# Patient Record
Sex: MALE | Race: WHITE | NOT HISPANIC OR LATINO | URBAN - METROPOLITAN AREA
[De-identification: names, ages, dates, MRNs, and addresses within clinical notes are randomized per-mention and may not be internally consistent; named-entity substitution may affect disease eponyms.]

---

## 2017-01-01 ENCOUNTER — HOSPITAL ENCOUNTER (EMERGENCY)
Facility: HOSPITAL | Age: 27
Discharge: HOME | End: 2017-12-11
Attending: EMERGENCY MEDICINE | Admitting: EMERGENCY MEDICINE

## 2017-01-01 ENCOUNTER — APPOINTMENT (EMERGENCY)
Dept: RADIOLOGY | Facility: HOSPITAL | Age: 27
End: 2017-01-01

## 2017-01-01 ENCOUNTER — HOSPITAL ENCOUNTER (EMERGENCY)
Facility: HOSPITAL | Age: 27
Discharge: HOME | End: 2017-11-16
Attending: EMERGENCY MEDICINE

## 2017-01-01 VITALS
RESPIRATION RATE: 16 BRPM | HEART RATE: 74 BPM | TEMPERATURE: 98.3 F | DIASTOLIC BLOOD PRESSURE: 74 MMHG | OXYGEN SATURATION: 97 % | SYSTOLIC BLOOD PRESSURE: 127 MMHG | WEIGHT: 202.6 LBS

## 2017-01-01 VITALS
HEART RATE: 110 BPM | DIASTOLIC BLOOD PRESSURE: 76 MMHG | RESPIRATION RATE: 18 BRPM | OXYGEN SATURATION: 100 % | SYSTOLIC BLOOD PRESSURE: 145 MMHG | TEMPERATURE: 99.1 F

## 2017-01-01 DIAGNOSIS — J02.9 PHARYNGITIS: Primary | ICD-10-CM

## 2017-01-01 DIAGNOSIS — B00.1 RECURRENT HERPES LABIALIS: ICD-10-CM

## 2017-01-01 DIAGNOSIS — F41.1 ANXIETY REACTION: ICD-10-CM

## 2017-01-01 DIAGNOSIS — F11.10 HEROIN ABUSE (HCC): Primary | ICD-10-CM

## 2017-01-01 LAB
ATRIAL RATE: 62 BPM
P AXIS: 75 DEGREES
PR INTERVAL: 136 MS
QRS AXIS: 78 DEGREES
QRSD INTERVAL: 96 MS
QT INTERVAL: 378 MS
QTC INTERVAL: 383 MS
T WAVE AXIS: 56 DEGREES
VENTRICULAR RATE: 62 BPM

## 2017-01-01 PROCEDURE — 71010 HB CHEST X-RAY 1 VIEW FRONTAL (PORTABLE): CPT

## 2017-01-01 PROCEDURE — 93005 ELECTROCARDIOGRAM TRACING: CPT | Performed by: EMERGENCY MEDICINE

## 2017-01-01 PROCEDURE — 99283 EMERGENCY DEPT VISIT LOW MDM: CPT

## 2017-01-01 PROCEDURE — 99284 EMERGENCY DEPT VISIT MOD MDM: CPT

## 2017-01-01 RX ORDER — IBUPROFEN 400 MG/1
400 TABLET ORAL ONCE
Status: COMPLETED | OUTPATIENT
Start: 2017-01-01 | End: 2017-01-01

## 2017-01-01 RX ORDER — VALACYCLOVIR HYDROCHLORIDE 500 MG/1
2000 TABLET, FILM COATED ORAL ONCE
Status: COMPLETED | OUTPATIENT
Start: 2017-01-01 | End: 2017-01-01

## 2017-01-01 RX ORDER — VALACYCLOVIR HYDROCHLORIDE 1 G/1
2000 TABLET, FILM COATED ORAL ONCE
Qty: 2 TABLET | Refills: 0 | Status: SHIPPED | OUTPATIENT
Start: 2017-01-01 | End: 2018-01-01

## 2017-01-01 RX ORDER — AMOXICILLIN AND CLAVULANATE POTASSIUM 875; 125 MG/1; MG/1
1 TABLET, FILM COATED ORAL EVERY 12 HOURS
Qty: 14 TABLET | Refills: 0 | Status: SHIPPED | OUTPATIENT
Start: 2017-01-01 | End: 2017-01-01

## 2017-01-01 RX ORDER — AMOXICILLIN AND CLAVULANATE POTASSIUM 875; 125 MG/1; MG/1
1 TABLET, FILM COATED ORAL ONCE
Status: COMPLETED | OUTPATIENT
Start: 2017-01-01 | End: 2017-01-01

## 2017-01-01 RX ADMIN — IBUPROFEN 400 MG: 400 TABLET, FILM COATED ORAL at 19:29

## 2017-01-01 RX ADMIN — AMOXICILLIN AND CLAVULANATE POTASSIUM 1 TABLET: 875; 125 TABLET, FILM COATED ORAL at 19:29

## 2017-01-01 RX ADMIN — LIDOCAINE HYDROCHLORIDE 15 ML: 20 SOLUTION ORAL; TOPICAL at 19:29

## 2017-01-01 RX ADMIN — VALACYCLOVIR HYDROCHLORIDE 2000 MG: 500 TABLET, FILM COATED ORAL at 19:32

## 2017-11-17 NOTE — ED PROVIDER NOTES
History  Chief Complaint   Patient presents with    Sore Throat     Pt presents with sore throat that got worse today, pt states he had a fever a week ago but returned today, pt states he has a cough and heaviness in his chest    Mouth Lesions     Pt also with cold sores on lips and in mouth       History provided by:  Patient   used: No     68-year-old male presented with worsening sore throat, slight cough as well as herpes labialis lesions developing over the last few days  States he has been sick overall about a week with fever, cough and his symptoms had improved but then over the last 24 hours significantly worsened  Has not taken any thing for pain  Some difficulty eating due to oral discomfort  On exam here he has multiple vesicular lesions on the lips and tongue  Posterior pharynx is erythematous with tonsillar edema  Tender cervical lymphadenopathy  Given oral course of illness suspect secondary bacterial infection after a viral illness in addition to herpes outbreak  Will treat with antibiotic, antiviral, lidocaine for symptomatic relief  Will have return if symptoms worsen  None       History reviewed  No pertinent past medical history  History reviewed  No pertinent surgical history  History reviewed  No pertinent family history  I have reviewed and agree with the history as documented  Social History   Substance Use Topics    Smoking status: Current Every Day Smoker    Smokeless tobacco: Never Used    Alcohol use No        Review of Systems   Constitutional: Positive for appetite change and fever  Negative for activity change and fatigue  HENT: Positive for sore throat  Negative for trouble swallowing and voice change  Respiratory: Positive for cough  Negative for chest tightness and shortness of breath  Musculoskeletal: Negative for neck pain and neck stiffness  Neurological: Negative for dizziness, weakness, light-headedness and numbness  All other systems reviewed and are negative  Physical Exam  ED Triage Vitals [11/16/17 1753]   Temperature Pulse Respirations Blood Pressure SpO2   99 1 °F (37 3 °C) (!) 110 18 145/76 100 %      Temp Source Heart Rate Source Patient Position - Orthostatic VS BP Location FiO2 (%)   Oral Monitor Sitting Left arm --      Pain Score       6           Orthostatic Vital Signs  Vitals:    11/16/17 1753   BP: 145/76   Pulse: (!) 110   Patient Position - Orthostatic VS: Sitting       Physical Exam   Constitutional: He is oriented to person, place, and time  He appears well-developed and well-nourished  No distress  HENT:   Head: Normocephalic and atraumatic  Multiple vesicular lesions on the lips, tongue  Posterior pharynx erythematous  Eyes: EOM are normal  Pupils are equal, round, and reactive to light  Neck: Normal range of motion  Neck supple  Cardiovascular: Normal rate and regular rhythm  Pulmonary/Chest: Effort normal and breath sounds normal    Neurological: He is alert and oriented to person, place, and time  Skin: Skin is warm and dry  No rash noted  Psychiatric: He has a normal mood and affect  His behavior is normal    Nursing note and vitals reviewed        ED Medications  Medications   valACYclovir (VALTREX) tablet 2,000 mg (2,000 mg Oral Given 11/16/17 1932)   amoxicillin-clavulanate (AUGMENTIN) 875-125 mg per tablet 1 tablet (1 tablet Oral Given 11/16/17 1929)   lidocaine viscous (XYLOCAINE) 2 % mucosal solution 15 mL (15 mL Swish & Swallow Given 11/16/17 1929)   ibuprofen (MOTRIN) tablet 400 mg (400 mg Oral Given 11/16/17 1929)       Diagnostic Studies  Results Reviewed     None                 No orders to display              Procedures  Procedures       Phone Contacts  ED Phone Contact    ED Course  ED Course                                MDM  Number of Diagnoses or Management Options  Pharyngitis:   Recurrent herpes labialis:   Diagnosis management comments: 24-year-old male with sore throat worsening over the last day  Sick previously, improved to now worse  Afebrile here  Also has oral herpes outbreak  Treating with Augmentin for suspected secondary bacterial infection as well as valacyclovir and lidocaine for symptomatic relief  To follow up if symptoms worsen  Patient Progress  Patient progress: improved    CritCare Time    Disposition  Final diagnoses:   Pharyngitis   Recurrent herpes labialis     Time reflects when diagnosis was documented in both MDM as applicable and the Disposition within this note     Time User Action Codes Description Comment    11/16/2017  8:13 PM Vladislav Agrawal Add [J02 9] Pharyngitis     11/16/2017  8:13 PM Wallace Singh Add [B00 1] Recurrent herpes labialis       ED Disposition     ED Disposition Condition Comment    Discharge  Henry Perez  discharge to home/self care  Condition at discharge: Stable        Follow-up Information     Follow up With Specialties Details Why Contact Info Additional [address] Emergency Department Emergency Medicine  If symptoms worsen [address]  AN ED, [address], [facility]        Discharge Medication List as of 11/16/2017  8:15 PM      START taking these medications    Details   amoxicillin-clavulanate (AUGMENTIN) 875-125 mg per tablet Take 1 tablet by mouth every 12 (twelve) hours for 7 days, Starting Thu 11/16/2017, Until Thu 11/23/2017, Print      lidocaine viscous (XYLOCAINE) 2 % mucosal solution Swish and swallow 10 mL 4 (four) times a day as needed for mild pain, Starting Thu 11/16/2017, Print      valACYclovir (VALTREX) 1,000 mg tablet Take 2 tablets by mouth once for 1 dose, Starting Thu 11/16/2017, Print           No discharge procedures on file      ED Provider  Electronically Signed by           Yazmin Figueroa MD  11/17/17 0189

## 2017-11-17 NOTE — DISCHARGE INSTRUCTIONS
Pharyngitis   AMBULATORY CARE:   Pharyngitis , or sore throat, is inflammation of the tissues and structures in your pharynx (throat)  Pharyngitis is most often caused by bacteria  It may also be caused by a cold or flu virus  Other causes include smoking, allergies, or acid reflux  Signs and symptoms that may occur with pharyngitis:   · Sore throat or pain when you swallow    · Fever, chills, and body aches    · Hoarse or raspy voice    · Cough, runny or stuffy nose, itchy or watery eyes    · Headache    · Upset stomach and loss of appetite    · Mild neck stiffness    · Swollen glands that feel like hard lumps when you touch your neck    · White and yellow pus-filled blisters in the back of your throat  Call 911 for any of the following:   · You have trouble breathing or swallowing because your throat is swollen or sore  Seek care immediately if:   · You are drooling because it hurts too much to swallow  · Your fever is higher than 102? F (39?C) or lasts longer than 3 days  · You are confused  · You taste blood in your throat  Contact your healthcare provider if:   · Your throat pain gets worse  · You have a painful lump in your throat that does not go away after 5 days  · Your symptoms do not improve after 5 days  · You have questions or concerns about your condition or care  Treatment for pharyngitis:  Viral pharyngitis will go away on its own without treatment  Your sore throat should start to feel better in 3 to 5 days for both viral and bacterial infections  You may need any of the following:  · Antibiotics  treat a bacterial infection  · NSAIDs , such as ibuprofen, help decrease swelling, pain, and fever  NSAIDs can cause stomach bleeding or kidney problems in certain people  If you take blood thinner medicine, always ask your healthcare provider if NSAIDs are safe for you  Always read the medicine label and follow directions  · Acetaminophen  decreases pain and fever   It is available without a doctor's order  Ask how much to take and how often to take it  Follow directions  Acetaminophen can cause liver damage if not taken correctly  Manage your symptoms:   · Gargle salt water  Mix ¼ teaspoon salt in an 8 ounce glass of warm water and gargle  This may help decrease swelling in your throat  · Drink liquids as directed  You may need to drink more liquids than usual  Liquids may help soothe your throat and prevent dehydration  Ask how much liquid to drink each day and which liquids are best for you  · Use a cool-steam humidifier  to help moisten the air in your room and calm your cough  · Soothe your throat  with cough drops, ice, soft foods, or popsicles  Prevent the spread of pharyngitis:  Cover your mouth and nose when you cough or sneeze  Do not share food or drinks  Wash your hands often  Use soap and water  If soap and water are unavailable, use an alcohol based hand   Follow up with your healthcare provider as directed:  Write down your questions so you remember to ask them during your visits  © 2017 [facility] [facility] Information is for End User's use only and may not be sold, redistributed or otherwise used for commercial purposes  All illustrations and images included in CareNotes® are the copyrighted property of A D A M [employer] Inc  or [employer]aver  The above information is an  only  It is not intended as medical advice for individual conditions or treatments  Talk to your doctor, nurse or pharmacist before following any medical regimen to see if it is safe and effective for you  Oral Herpes Simplex Virus Infections   WHAT YOU NEED TO KNOW:   What are oral herpes simplex virus infections? Oral herpes simplex virus (HSV) infections cause sores to form on the mouth, lips, or gums  HSV has 2 types  Oral HSV infections are most often caused by HSV type 1   HSV type 2 normally affects the genital area, but may also occur in the mouth  After you are infected, the virus hides in your nerves and may return  An HSV infection that comes back is also known as a cold sore  What causes oral herpes simplex virus infections? Oral HSV infections are easily spread through close, personal contact with someone who has HSV  The infection may be spread when you touch the blisters and then touch your eyes, mouth, nose, or a cut  The infection can also spread through a kiss or on a shared eating utensil  Oral HSV infections that come back may be triggered by stress, trauma, or exposure to temperature extremes  They may also be triggered by anything that weakens your immune system, such as a cold or the flu  What are the signs and symptoms of oral herpes simplex virus infections? Signs and symptoms of oral HSV infections usually develop suddenly and heal without treatment in about 10 days  Sores are usually filled with a yellow-white fluid and may break easily  Sores may join together to form larger open sores  You may also have the following:  · Burning, tingling, itching, or pain at the affected area before sores form    · Blisters or painful small, round, shallow ulcers on the lips, mouth, or gums    · Fever, chills, headache, and enlarged lymph nodes in the neck    · Getting tired and irritated easily and more than normal for you    · Loss of appetite or not wanting to eat or drink    · Red, swollen, bleeding gums, or sore throat  How are oral herpes simplex virus infections diagnosed? Your healthcare provider will examine you and carefully look at your blisters  He may ask if you have other medical conditions  He will also need to know when the sores started, along with your other symptoms  You may also need any of the following:  · Fluid sample:  A sample is taken from the affected area and checked under the microscope or sent for other testing       · Blood tests:  Your blood is tested for antibodies and to see if you have been exposed to HSV  The tests may also show if you have developed antibodies from exposure to HSV  · Biopsy: This is a procedure used to remove a small piece of tissue from the ulcer  This sample is then sent to the lab for tests  How are oral herpes simplex virus infections treated? HSV infection sores normally heal on their own without scarring  You may need any of the following to help manage symptoms:  · Antiviral medicine: This decreases symptoms and shortens the amount of time blisters are present  You may also need to take it daily to prevent blisters  The medicine may be given as a liquid, , pill, or ointment  Use as directed  · Numbing medicine: This decreases mouth pain  It is usually given as a mouth rinse  Use it before you eat or drink, or as directed  How can I manage my symptoms? · Eat soft, bland foods:  Avoid salty, acidic, spicy, sharp-edged, and hard foods  Eat healthy foods to help healing  · Drink liquids:  Cool liquids may help soothe your mouth and numb the pain  Avoid citrus or carbonated drinks, such as orange or grapefruit juice, lemonade, or soda  These liquids may cause your mouth to hurt more  A straw may help if you have blisters on the lips or tongue  · Use ice:  Ice helps decrease swelling and pain  Drink cold water or suck on ice to help decrease pain on your tongue or inside your mouth  Use an ice pack, or put crushed ice in a plastic bag on your lip  Cover it with a towel and place it on your lip for 15 to 20 minutes every hour or as directed  What are the risks of oral herpes simplex virus infection? If left untreated, an oral HSV infection may make it difficult to eat or drink because of the pain  The infection may spread to other parts of your body or to other people  The infection can return and affect other areas, such as your brain  How can I prevent the spread of the herpes simplex virus?    · Do not have close contact with people until the blisters heal  This includes touching, kissing, and oral sex  · Do not get close to babies or to people who are sick while you have cold sores  · Do not share eating utensils, towels, lip balm, or makeup with another person  · Do not touch the blisters or pick at the scabs  Do not touch other body parts, especially your eyes or genitals without washing your hands first  Wash your hands often  When should I contact my healthcare provider? · You have a fever  · Your symptoms become worse or do not improve a week after you start treatment  · You have difficulty eating or drinking because of the pain in your mouth  · You get a headache, are nauseated, or vomit  · Your eyes feel irritated or you feel like you have something in your eye  · Your skin becomes itchy, swollen, or develops a rash after you take your medicine  · You have questions or concerns about your condition or care  When should I seek immediate care? · You get a fever, feel achy, or see pus instead of clear fluid in the sores  · You get sores on your eyes  · You have abdominal pain, a severe headache, or confusion  · You get new symptoms, or old symptoms return after you have been treated  CARE AGREEMENT:   You have the right to help plan your care  Learn about your health condition and how it may be treated  Discuss treatment options with your caregivers to decide what care you want to receive  You always have the right to refuse treatment  The above information is an  only  It is not intended as medical advice for individual conditions or treatments  Talk to your doctor, nurse or pharmacist before following any medical regimen to see if it is safe and effective for you  © 2017 [address] [employer] Information is for End User's use only and may not be sold, redistributed or otherwise used for commercial purposes   All illustrations and images included in CareNotes® are the copyrighted property of [employer]  or Eben Beaver

## 2017-12-11 NOTE — ED PROCEDURE NOTE
PROCEDURE  ECG 12 Lead Documentation  Date/Time: 12/11/2017 7:51 AM  Performed by: Sierra Samuels by: Susy Barnett     ECG reviewed by me, the ED Provider: yes    Patient location:  ED  Previous ECG:     Previous ECG:  Compared to current    Similarity:  No change  Interpretation:     Interpretation: normal    Rate:     ECG rate assessment: normal    Rhythm:     Rhythm: sinus rhythm    Ectopy:     Ectopy: none    QRS:     QRS axis:  Normal    QRS intervals:  Normal  Conduction:     Conduction: normal    ST segments:     ST segments:  Normal  T waves:     T waves: normal

## 2017-12-11 NOTE — ED PROVIDER NOTES
History  Chief Complaint   Patient presents with    Overdose - Accidental     Pt  was getting high "and accidently took too much oxycodone and heroin " Pt  used 7-8 bags of heroin over last day, 60 mg of oxycodone over the last day  Pt  reports having difficulty breathing       History provided by:  Patient  Overdose - Accidental   Ingested substance:  Illicit drugs  Suspected agents: heroin   Witnesses present: no    Called poison control: no    Context: recreational (Was sober for 7 years and relapsed into heroin abuse last night)    Associated symptoms: anxiety and shortness of breath (Complaints of shortness of breath at this point time  States that he is having a hard time getting oxygen in  Does not appear intoxicated but appears very stressed and full of anxiety)    Associated symptoms: no abdominal pain, no chest pain, no cough, no diarrhea, no headaches and no nausea        Prior to Admission Medications   Prescriptions Last Dose Informant Patient Reported? Taking?   lidocaine viscous (XYLOCAINE) 2 % mucosal solution   No No   Sig: Swish and swallow 10 mL 4 (four) times a day as needed for mild pain   valACYclovir (VALTREX) 1,000 mg tablet   No No   Sig: Take 2 tablets by mouth once for 1 dose      Facility-Administered Medications: None       History reviewed  No pertinent past medical history  History reviewed  No pertinent surgical history  History reviewed  No pertinent family history  I have reviewed and agree with the history as documented  Social History   Substance Use Topics    Smoking status: Current Every Day Smoker    Smokeless tobacco: Never Used    Alcohol use No        Review of Systems   Constitutional: Negative for chills and fever  HENT: Negative for rhinorrhea, sore throat and trouble swallowing  Eyes: Negative for pain  Respiratory: Positive for shortness of breath (Complaints of shortness of breath at this point time    States that he is having a hard time getting oxygen in  Does not appear intoxicated but appears very stressed and full of anxiety)  Negative for cough, wheezing and stridor  Cardiovascular: Negative for chest pain and leg swelling  Gastrointestinal: Negative for abdominal pain, diarrhea and nausea  Endocrine: Negative for polyuria  Genitourinary: Negative for dysuria, flank pain and urgency  Musculoskeletal: Negative for joint swelling, myalgias and neck stiffness  Skin: Negative for rash  Allergic/Immunologic: Negative for immunocompromised state  Neurological: Negative for dizziness, syncope, weakness, numbness and headaches  Psychiatric/Behavioral: Negative for confusion and suicidal ideas  The patient is nervous/anxious  All other systems reviewed and are negative  Physical Exam  ED Triage Vitals [12/11/17 0737]   Temperature Pulse Respirations Blood Pressure SpO2   98 3 °F (36 8 °C) 81 18 143/75 99 %      Temp Source Heart Rate Source Patient Position - Orthostatic VS BP Location FiO2 (%)   Oral Monitor Lying Right arm --      Pain Score       No Pain           Orthostatic Vital Signs  Vitals:    12/11/17 0737 12/11/17 0829   BP: 143/75 127/74   Pulse: 81 74   Patient Position - Orthostatic VS: Lying Sitting       Physical Exam   Constitutional: He is oriented to person, place, and time  He appears well-developed and well-nourished  HENT:   Head: Normocephalic and atraumatic  Eyes: EOM are normal  Pupils are equal, round, and reactive to light  Neck: Normal range of motion  Neck supple  Cardiovascular: Normal rate and regular rhythm  Exam reveals no friction rub  No murmur heard  Pulmonary/Chest: Breath sounds normal  No respiratory distress  He has no wheezes  He has no rales  Abdominal: Soft  Bowel sounds are normal  He exhibits no distension  There is no tenderness  Musculoskeletal: Normal range of motion  He exhibits no edema or tenderness     Neurological: He is alert and oriented to person, place, and time  Skin: Skin is warm  No rash noted  Psychiatric: He has a normal mood and affect  Nursing note and vitals reviewed  ED Medications  Medications - No data to display    Diagnostic Studies  Results Reviewed     None                 XR chest 1 view portable    (Results Pending)              Procedures  Procedures       Phone Contacts  ED Phone Contact    ED Course  ED Course                                MDM  Number of Diagnoses or Management Options  Anxiety reaction: new and requires workup  Heroin abuse: new and requires workup  Diagnosis management comments: 24-year-old with history of heroin abuse sober for 7 years started using again last night noted with shortness of breath and anxiety earlier this morning  Last dose of heroin was approximately 1-2 hours ago  Does not appear intoxicated at the on time a on initial evaluation  No respiratory distress no decreased respirations  Possibly could be anxiety or stress reaction  Patient states been going through significant amount of life stresses that have been causing the patient to use heroin  He at this point the on the EKG and x-rays unremarkable  Plan outpatient management and follow-up  Adriana cantu         Amount and/or Complexity of Data Reviewed  Tests in the radiology section of CPT®: ordered and reviewed      CritCare Time    Disposition  Final diagnoses:   Heroin abuse   Anxiety reaction     Time reflects when diagnosis was documented in both MDM as applicable and the Disposition within this note     Time User Action Codes Description Comment    12/11/2017  8:25 AM Celester Levy Add [F11 10] Heroin abuse     12/11/2017  8:25 AM Celester Levy Add [F41 1] Anxiety reaction       ED Disposition     ED Disposition Condition Comment    Discharge  Alaina Kohler  discharge to home/self care      Condition at discharge: Stable        Follow-up Information     Follow up With Specialties Details Why [address] [facility]  Call in 2 days If symptoms worsen [facility]        Discharge Medication List as of 12/11/2017  8:26 AM      CONTINUE these medications which have NOT CHANGED    Details   lidocaine viscous (XYLOCAINE) 2 % mucosal solution Swish and swallow 10 mL 4 (four) times a day as needed for mild pain, Starting Thu 11/16/2017, Print      valACYclovir (VALTREX) 1,000 mg tablet Take 2 tablets by mouth once for 1 dose, Starting Thu 11/16/2017, Print           No discharge procedures on file      ED Provider  Electronically Signed by           Frank Nelsno DO  12/11/17 2904

## 2017-12-11 NOTE — DISCHARGE INSTRUCTIONS
Anxiety   WHAT YOU SHOULD KNOW:   Anxiety is a condition that causes you to feel excessive worry, uneasiness, or fear  Family or work stress, smoking, caffeine, and alcohol can increase your risk for anxiety  Certain medicines or health conditions can also increase your risk  Anxiety may begin gradually, and can become a long-term condition if it is not managed or treated  AFTER YOU LEAVE:   Medicines:   · Medicines  can help you feel more calm and relaxed, and decrease your symptoms  · Take your medicine as directed  Contact your healthcare provider if you think your medicine is not helping or if you have side effects  Tell him if you are allergic to any medicine  Keep a list of the medicines, vitamins, and herbs you take  Include the amounts, and when and why you take them  Bring the list or the pill bottles to follow-up visits  Carry your medicine list with you in case of an emergency  Follow up with your healthcare provider within 2 weeks or as directed:  Write down your questions so you remember to ask them during your visits  Manage anxiety:   · Go to counseling as directed  Cognitive behavioral therapy can help you understand and change how you react to events that trigger your symptoms  · Find ways to manage your symptoms  Activities such as exercise, meditation, or listening to music can help you relax  · Practice deep breathing  Breathing can change how your body reacts to stress  Focus on taking slow, deep breaths several times a day, or during an anxiety attack  Breathe in through your nose, and out through your mouth  · Avoid caffeine  Caffeine can make your symptoms worse  Avoid foods or drinks that are meant to increase your energy level  · Limit or avoid alcohol  Ask your healthcare provider if alcohol is safe for you  You may not be able to drink alcohol if you take certain anxiety or depression medicines  Limit alcohol to 1 drink per day if you are a woman   Limit alcohol to 2 drinks per day if you are a man  A drink of alcohol is 12 ounces of beer, 5 ounces of wine, or 1½ ounces of liquor  Contact your healthcare provider if:   · Your symptoms get worse or do not get better with treatment  · You think your medicine may be causing side effects  · Your anxiety keeps you from doing your regular daily activities  · You have new symptoms since your last visit  · You have questions or concerns about your condition or care  Seek care immediately or call 911 if:   · You have chest pain, tightness, or heaviness that may spread to your shoulders, arms, jaw, neck, or back  · You feel like hurting yourself or someone else  · You feel dizzy, lightheaded, or faint  © 2014 [address] is for End User's use only and may not be sold, redistributed or otherwise used for commercial purposes  All illustrations and images included in CareNotes® are the copyrighted property of A D A M , Inc  or Eben Beaver  The above information is an  only  It is not intended as medical advice for individual conditions or treatments  Talk to your doctor, nurse or pharmacist before following any medical regimen to see if it is safe and effective for you  Narcotic Abuse   AMBULATORY CARE:   Narcotic abuse  is when you continue to use narcotics even though they are hurting you or others  Common symptoms include the following:   · Trouble doing your job, attending school, or doing necessary things at home, such as care for your children    · Driving a vehicle or operating machinery while under the influence of narcotics    · Legal problems, such as being arrested while under the influence of narcotics    · Ongoing problems with your friends, family, or others that are caused or made worse by using narcotics  Seek care immediately if:   · You are very drowsy  · Your speech is slurred      · You have trouble thinking, remembering things, or focusing  Contact your healthcare provider if:   · You want help or information on how to stop using or abusing narcotics  Narcotic dependence  is when using the drugs leads to at least 3 of the following problems within 1 year:  · Tolerance to narcotics  that makes you need more narcotics to feel its effects  · Withdrawal symptoms  if you stop using narcotics after using them heavily over a period of time  Withdrawal may also happen if your healthcare provider changes your medicine  You may try using a similar drug to reduce or prevent the signs and symptoms of withdrawal      · Use of more narcotics  than you should  You may use more of the narcotic or use it over a longer period of time than you intended  · No ability to decrease or control  your use of narcotics  You may want narcotics all of the time  You may feel it is not possible to decrease or control the amount you are using  · Less time spent  around others, at work, or doing activities that you enjoy  You may spend most or all of your time using narcotics, searching for narcotics, or managing a hangover  A hangover is a feeling you have hours after using a drug  You may feel very tired and nauseated  · Continued use of the drug  even though it worsens your physical or mental condition  For example, you may get depressed after you use narcotics, but you keep using them  Narcotic intoxication  usually lasts for several hours  You may have the following during or after you use narcotics:  · Behavior or mood changes, such as a great feeling followed by the feeling that you do not care about anyone or anything    · Trouble thinking, remembering things, or focusing    · Small pupils    · Feeling very drowsy    · Slurred speech  Narcotic withdrawal  occurs if you stop using narcotics after using them heavily over a period of time   Signs and symptoms may begin within minutes or days and continue for days or even months:  · Depression and anxiety    · Nausea or vomiting    · Muscle aches    · Watery eyes or runny nose    · Large pupils    · Sweating or goosebumps on your skin    · Diarrhea    · Fever    · Trouble sleeping  How narcotics can harm a pregnant woman and her baby:   · Tell your healthcare provider right away if you are trying to get pregnant or you are pregnant and you are using narcotics  Your doctor may suggest other medicines to control pain and prevent withdrawal  If you go through withdrawal while pregnant, you may miscarry your baby, or he may be stillborn  He may be very small and have other medical problems  · When your baby is born, he may show signs of withdrawal  This includes unexpected weight loss, poor feeding, and more crying than normal  Your baby may also have a fever, vomit, and have diarrhea  He may also have learning problems or other health issues when he gets older  If you have a baby and you are using narcotics, you may have trouble caring for your baby  Narcotics may be passed to your baby through breast milk  Talk to your healthcare provider before breastfeeding your baby if you are using narcotics  Follow up with your healthcare provider as directed:  Write down your questions so you remember to ask them during your visits  © 2017 [address] [employer]  Information is for End User's use only and may not be sold, redistributed or otherwise used for commercial purposes  All illustrations and images included in CareNotes® are the copyrighted property of [employer]  or Eben Beaver  The above information is an  only  It is not intended as medical advice for individual conditions or treatments  Talk to your doctor, nurse or pharmacist before following any medical regimen to see if it is safe and effective for you

## 2018-01-01 ENCOUNTER — HOSPITAL ENCOUNTER (EMERGENCY)
Facility: HOSPITAL | Age: 28
Discharge: LEFT BEFORE DISCHARGE | End: 2018-06-23
Attending: EMERGENCY MEDICINE

## 2018-01-01 ENCOUNTER — HOSPITAL ENCOUNTER (EMERGENCY)
Facility: HOSPITAL | Age: 28
Discharge: HOME | End: 2018-05-19
Attending: EMERGENCY MEDICINE | Admitting: EMERGENCY MEDICINE
Payer: COMMERCIAL

## 2018-01-01 ENCOUNTER — APPOINTMENT (EMERGENCY)
Dept: RADIOLOGY | Facility: HOSPITAL | Age: 28
End: 2018-01-01
Payer: COMMERCIAL

## 2018-01-01 ENCOUNTER — TELEPHONE (OUTPATIENT)
Dept: NEUROLOGY | Facility: CLINIC | Age: 28
End: 2018-01-01

## 2018-01-01 ENCOUNTER — HOSPITAL ENCOUNTER (EMERGENCY)
Facility: HOSPITAL | Age: 28
Discharge: HOME | End: 2018-01-22
Attending: EMERGENCY MEDICINE | Admitting: EMERGENCY MEDICINE
Payer: COMMERCIAL

## 2018-01-01 ENCOUNTER — APPOINTMENT (EMERGENCY)
Dept: CT IMAGING | Facility: HOSPITAL | Age: 28
End: 2018-01-01
Payer: COMMERCIAL

## 2018-01-01 ENCOUNTER — HOSPITAL ENCOUNTER (EMERGENCY)
Facility: HOSPITAL | Age: 28
End: 2018-08-10
Attending: EMERGENCY MEDICINE | Admitting: EMERGENCY MEDICINE

## 2018-01-01 ENCOUNTER — HOSPITAL ENCOUNTER (EMERGENCY)
Facility: HOSPITAL | Age: 28
Discharge: HOME | End: 2018-01-25
Attending: EMERGENCY MEDICINE | Admitting: EMERGENCY MEDICINE
Payer: COMMERCIAL

## 2018-01-01 ENCOUNTER — HOSPITAL ENCOUNTER (EMERGENCY)
Facility: HOSPITAL | Age: 28
Discharge: HOME | End: 2018-06-21
Attending: EMERGENCY MEDICINE | Admitting: EMERGENCY MEDICINE

## 2018-01-01 VITALS
RESPIRATION RATE: 16 BRPM | BODY MASS INDEX: 29.82 KG/M2 | WEIGHT: 225 LBS | TEMPERATURE: 97.9 F | SYSTOLIC BLOOD PRESSURE: 137 MMHG | HEART RATE: 70 BPM | DIASTOLIC BLOOD PRESSURE: 60 MMHG | OXYGEN SATURATION: 97 % | HEIGHT: 73 IN

## 2018-01-01 VITALS
HEART RATE: 100 BPM | BODY MASS INDEX: 28.01 KG/M2 | TEMPERATURE: 99.8 F | SYSTOLIC BLOOD PRESSURE: 142 MMHG | WEIGHT: 212.3 LBS | DIASTOLIC BLOOD PRESSURE: 63 MMHG | RESPIRATION RATE: 18 BRPM | OXYGEN SATURATION: 97 %

## 2018-01-01 VITALS
RESPIRATION RATE: 18 BRPM | SYSTOLIC BLOOD PRESSURE: 112 MMHG | WEIGHT: 207.89 LBS | DIASTOLIC BLOOD PRESSURE: 65 MMHG | TEMPERATURE: 98.2 F | HEIGHT: 73 IN | HEART RATE: 56 BPM | OXYGEN SATURATION: 98 % | BODY MASS INDEX: 27.55 KG/M2

## 2018-01-01 VITALS
BODY MASS INDEX: 27.71 KG/M2 | HEART RATE: 87 BPM | TEMPERATURE: 97.6 F | SYSTOLIC BLOOD PRESSURE: 149 MMHG | DIASTOLIC BLOOD PRESSURE: 71 MMHG | RESPIRATION RATE: 18 BRPM | OXYGEN SATURATION: 97 % | WEIGHT: 210 LBS

## 2018-01-01 VITALS
RESPIRATION RATE: 17 BRPM | DIASTOLIC BLOOD PRESSURE: 70 MMHG | TEMPERATURE: 98.6 F | HEART RATE: 101 BPM | OXYGEN SATURATION: 98 % | SYSTOLIC BLOOD PRESSURE: 157 MMHG

## 2018-01-01 VITALS — TEMPERATURE: 93.1 F

## 2018-01-01 DIAGNOSIS — S00.83XA FACIAL CONTUSION, INITIAL ENCOUNTER: Primary | ICD-10-CM

## 2018-01-01 DIAGNOSIS — S06.0X9A CONCUSSION: ICD-10-CM
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
## 2018-01-01 DIAGNOSIS — I46.9 CARDIAC ARREST (HCC): Primary | ICD-10-CM
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
## 2018-01-01 DIAGNOSIS — S76.012A HIP STRAIN, LEFT, INITIAL ENCOUNTER: ICD-10-CM

## 2018-01-01 DIAGNOSIS — G43.809 OTHER MIGRAINE WITHOUT STATUS MIGRAINOSUS, NOT INTRACTABLE: Primary | ICD-10-CM

## 2018-01-01 DIAGNOSIS — R53.83 LETHARGY: Primary | ICD-10-CM

## 2018-01-01 DIAGNOSIS — T14.8XXA MUSCLE CONTUSION: Primary | ICD-10-CM

## 2018-01-01 DIAGNOSIS — M54.2 NECK PAIN: ICD-10-CM

## 2018-01-01 DIAGNOSIS — E86.0 DEHYDRATION: Primary | ICD-10-CM

## 2018-01-01 DIAGNOSIS — E87.1 ACUTE HYPONATREMIA: ICD-10-CM

## 2018-01-01 DIAGNOSIS — S22.39XA CLOSED RIB FRACTURE: ICD-10-CM

## 2018-01-01 LAB
ALBUMIN SERPL BCP-MCNC: 3.4 G/DL (ref 3.5–5)
ALBUMIN SERPL BCP-MCNC: 3.4 G/DL (ref 3.5–5)
ALP SERPL-CCNC: 81 U/L (ref 46–116)
ALP SERPL-CCNC: 93 U/L (ref 46–116)
ALT SERPL W P-5'-P-CCNC: 16 U/L (ref 12–78)
ALT SERPL W P-5'-P-CCNC: 97 U/L (ref 12–78)
ANION GAP SERPL CALCULATED.3IONS-SCNC: 4 MMOL/L (ref 4–13)
ANION GAP SERPL CALCULATED.3IONS-SCNC: 7 MMOL/L (ref 4–13)
ANION GAP SERPL CALCULATED.3IONS-SCNC: 8 MMOL/L (ref 4–13)
AST SERPL W P-5'-P-CCNC: 11 U/L (ref 5–45)
AST SERPL W P-5'-P-CCNC: 56 U/L (ref 5–45)
B BURGDOR IGG SER IA-ACNC: 0.18
B BURGDOR IGM SER IA-ACNC: 0.33
BACTERIA UR QL AUTO: NORMAL /HPF
BASOPHILS # BLD AUTO: 0.01 THOUSANDS/ΜL (ref 0–0.1)
BASOPHILS # BLD AUTO: 0.04 THOUSANDS/ΜL (ref 0–0.1)
BASOPHILS # BLD AUTO: 0.08 THOUSANDS/ΜL (ref 0–0.1)
BASOPHILS NFR BLD AUTO: 0 % (ref 0–1)
BASOPHILS NFR BLD AUTO: 0 % (ref 0–1)
BASOPHILS NFR BLD AUTO: 1 % (ref 0–1)
BILIRUB SERPL-MCNC: 0.3 MG/DL (ref 0.2–1)
BILIRUB SERPL-MCNC: 0.31 MG/DL (ref 0.2–1)
BILIRUB UR QL STRIP: NEGATIVE
BUN SERPL-MCNC: 13 MG/DL (ref 5–25)
BUN SERPL-MCNC: 13 MG/DL (ref 5–25)
BUN SERPL-MCNC: 8 MG/DL (ref 5–25)
CALCIUM SERPL-MCNC: 8.6 MG/DL (ref 8.3–10.1)
CALCIUM SERPL-MCNC: 8.6 MG/DL (ref 8.3–10.1)
CALCIUM SERPL-MCNC: 8.9 MG/DL (ref 8.3–10.1)
CHLORIDE SERPL-SCNC: 102 MMOL/L (ref 100–108)
CHLORIDE SERPL-SCNC: 102 MMOL/L (ref 100–108)
CHLORIDE SERPL-SCNC: 99 MMOL/L (ref 100–108)
CK SERPL-CCNC: 68 U/L (ref 39–308)
CLARITY UR: ABNORMAL
CO2 SERPL-SCNC: 27 MMOL/L (ref 21–32)
CO2 SERPL-SCNC: 28 MMOL/L (ref 21–32)
CO2 SERPL-SCNC: 29 MMOL/L (ref 21–32)
COLOR UR: ABNORMAL
COLOR, POC: NORMAL
CREAT SERPL-MCNC: 0.85 MG/DL (ref 0.6–1.3)
CREAT SERPL-MCNC: 0.99 MG/DL (ref 0.6–1.3)
CREAT SERPL-MCNC: 1.16 MG/DL (ref 0.6–1.3)
EOSINOPHIL # BLD AUTO: 0.03 THOUSAND/ΜL (ref 0–0.61)
EOSINOPHIL # BLD AUTO: 0.2 THOUSAND/ΜL (ref 0–0.61)
EOSINOPHIL # BLD AUTO: 0.34 THOUSAND/ΜL (ref 0–0.61)
EOSINOPHIL NFR BLD AUTO: 0 % (ref 0–6)
EOSINOPHIL NFR BLD AUTO: 2 % (ref 0–6)
EOSINOPHIL NFR BLD AUTO: 3 % (ref 0–6)
ERYTHROCYTE [DISTWIDTH] IN BLOOD BY AUTOMATED COUNT: 13 % (ref 11.6–15.1)
ERYTHROCYTE [DISTWIDTH] IN BLOOD BY AUTOMATED COUNT: 14 % (ref 11.6–15.1)
ERYTHROCYTE [DISTWIDTH] IN BLOOD BY AUTOMATED COUNT: 14.8 % (ref 11.6–15.1)
GFR SERPL CREATININE-BSD FRML MDRD: 102 ML/MIN/1.73SQ M
GFR SERPL CREATININE-BSD FRML MDRD: 118 ML/MIN/1.73SQ M
GFR SERPL CREATININE-BSD FRML MDRD: 85 ML/MIN/1.73SQ M
GLUCOSE SERPL-MCNC: 127 MG/DL (ref 65–140)
GLUCOSE SERPL-MCNC: 133 MG/DL (ref 65–140)
GLUCOSE SERPL-MCNC: 70 MG/DL (ref 65–140)
GLUCOSE UR STRIP-MCNC: NEGATIVE MG/DL
HCT VFR BLD AUTO: 37.7 % (ref 36.5–49.3)
HCT VFR BLD AUTO: 38.1 % (ref 36.5–49.3)
HCT VFR BLD AUTO: 41.7 % (ref 36.5–49.3)
HGB BLD-MCNC: 13.2 G/DL (ref 12–17)
HGB BLD-MCNC: 13.9 G/DL (ref 12–17)
HGB BLD-MCNC: 15.1 G/DL (ref 12–17)
HGB UR QL STRIP.AUTO: NEGATIVE
HYALINE CASTS #/AREA URNS LPF: NORMAL /LPF
KETONES UR STRIP-MCNC: ABNORMAL MG/DL
LEUKOCYTE ESTERASE UR QL STRIP: NEGATIVE
LYMPHOCYTES # BLD AUTO: 0.62 THOUSANDS/ΜL (ref 0.6–4.47)
LYMPHOCYTES # BLD AUTO: 2.79 THOUSANDS/ΜL (ref 0.6–4.47)
LYMPHOCYTES # BLD AUTO: 4.21 THOUSANDS/ΜL (ref 0.6–4.47)
LYMPHOCYTES NFR BLD AUTO: 24 % (ref 14–44)
LYMPHOCYTES NFR BLD AUTO: 39 % (ref 14–44)
LYMPHOCYTES NFR BLD AUTO: 7 % (ref 14–44)
MCH RBC QN AUTO: 28.8 PG (ref 26.8–34.3)
MCH RBC QN AUTO: 29.1 PG (ref 26.8–34.3)
MCH RBC QN AUTO: 29.6 PG (ref 26.8–34.3)
MCHC RBC AUTO-ENTMCNC: 35 G/DL (ref 31.4–37.4)
MCHC RBC AUTO-ENTMCNC: 36.2 G/DL (ref 31.4–37.4)
MCHC RBC AUTO-ENTMCNC: 36.5 G/DL (ref 31.4–37.4)
MCV RBC AUTO: 80 FL (ref 82–98)
MCV RBC AUTO: 80 FL (ref 82–98)
MCV RBC AUTO: 85 FL (ref 82–98)
MONOCYTES # BLD AUTO: 0.45 THOUSAND/ΜL (ref 0.17–1.22)
MONOCYTES # BLD AUTO: 0.45 THOUSAND/ΜL (ref 0.17–1.22)
MONOCYTES # BLD AUTO: 1.16 THOUSAND/ΜL (ref 0.17–1.22)
MONOCYTES NFR BLD AUTO: 5 % (ref 4–12)
MONOCYTES NFR BLD AUTO: 6 % (ref 4–12)
MONOCYTES NFR BLD AUTO: 7 % (ref 4–12)
NEUTROPHILS # BLD AUTO: 12.1 THOUSANDS/ΜL (ref 1.85–7.62)
NEUTROPHILS # BLD AUTO: 3.74 THOUSANDS/ΜL (ref 1.85–7.62)
NEUTROPHILS # BLD AUTO: 7.76 THOUSANDS/ΜL (ref 1.85–7.62)
NEUTS SEG NFR BLD AUTO: 52 % (ref 43–75)
NEUTS SEG NFR BLD AUTO: 67 % (ref 43–75)
NEUTS SEG NFR BLD AUTO: 88 % (ref 43–75)
NITRITE UR QL STRIP: NEGATIVE
NON-SQ EPI CELLS URNS QL MICRO: NORMAL /HPF
NRBC BLD AUTO-RTO: 0 /100 WBCS
PH UR STRIP.AUTO: 6 [PH] (ref 4.5–8)
PLATELET # BLD AUTO: 145 THOUSANDS/UL (ref 149–390)
PLATELET # BLD AUTO: 176 THOUSANDS/UL (ref 149–390)
PLATELET # BLD AUTO: 216 THOUSANDS/UL (ref 149–390)
PMV BLD AUTO: 10.6 FL (ref 8.9–12.7)
PMV BLD AUTO: 10.7 FL (ref 8.9–12.7)
PMV BLD AUTO: 11 FL (ref 8.9–12.7)
POTASSIUM SERPL-SCNC: 3.5 MMOL/L (ref 3.5–5.3)
POTASSIUM SERPL-SCNC: 3.8 MMOL/L (ref 3.5–5.3)
POTASSIUM SERPL-SCNC: 3.9 MMOL/L (ref 3.5–5.3)
PROT SERPL-MCNC: 6.8 G/DL (ref 6.4–8.2)
PROT SERPL-MCNC: 7.4 G/DL (ref 6.4–8.2)
PROT UR STRIP-MCNC: ABNORMAL MG/DL
RBC # BLD AUTO: 4.46 MILLION/UL (ref 3.88–5.62)
RBC # BLD AUTO: 4.77 MILLION/UL (ref 3.88–5.62)
RBC # BLD AUTO: 5.24 MILLION/UL (ref 3.88–5.62)
RBC #/AREA URNS AUTO: NORMAL /HPF
SODIUM SERPL-SCNC: 134 MMOL/L (ref 136–145)
SODIUM SERPL-SCNC: 135 MMOL/L (ref 136–145)
SODIUM SERPL-SCNC: 137 MMOL/L (ref 136–145)
SP GR UR STRIP.AUTO: >=1.03 (ref 1–1.03)
UROBILINOGEN UR QL STRIP.AUTO: 1 E.U./DL
WBC # BLD AUTO: 17.97 THOUSAND/UL (ref 4.31–10.16)
WBC # BLD AUTO: 7.22 THOUSAND/UL (ref 4.31–10.16)
WBC # BLD AUTO: 8.87 THOUSAND/UL (ref 4.31–10.16)
WBC #/AREA URNS AUTO: NORMAL /HPF

## 2018-01-01 PROCEDURE — 96375 TX/PRO/DX INJ NEW DRUG ADDON: CPT

## 2018-01-01 PROCEDURE — 96360 HYDRATION IV INFUSION INIT: CPT

## 2018-01-01 PROCEDURE — 96374 THER/PROPH/DIAG INJ IV PUSH: CPT

## 2018-01-01 PROCEDURE — 99284 EMERGENCY DEPT VISIT MOD MDM: CPT

## 2018-01-01 PROCEDURE — 36415 COLL VENOUS BLD VENIPUNCTURE: CPT | Performed by: EMERGENCY MEDICINE

## 2018-01-01 PROCEDURE — 72100 X-RAY EXAM L-S SPINE 2/3 VWS: CPT

## 2018-01-01 PROCEDURE — 85025 COMPLETE CBC W/AUTO DIFF WBC: CPT | Performed by: PHYSICIAN ASSISTANT

## 2018-01-01 PROCEDURE — 81001 URINALYSIS AUTO W/SCOPE: CPT

## 2018-01-01 PROCEDURE — 71046 X-RAY EXAM CHEST 2 VIEWS: CPT

## 2018-01-01 PROCEDURE — 96365 THER/PROPH/DIAG IV INF INIT: CPT

## 2018-01-01 PROCEDURE — 72125 CT NECK SPINE W/O DYE: CPT

## 2018-01-01 PROCEDURE — 85025 COMPLETE CBC W/AUTO DIFF WBC: CPT | Performed by: EMERGENCY MEDICINE

## 2018-01-01 PROCEDURE — 36415 COLL VENOUS BLD VENIPUNCTURE: CPT | Performed by: PHYSICIAN ASSISTANT

## 2018-01-01 PROCEDURE — 80048 BASIC METABOLIC PNL TOTAL CA: CPT | Performed by: EMERGENCY MEDICINE

## 2018-01-01 PROCEDURE — 92950 HEART/LUNG RESUSCITATION CPR: CPT

## 2018-01-01 PROCEDURE — 70486 CT MAXILLOFACIAL W/O DYE: CPT

## 2018-01-01 PROCEDURE — 80053 COMPREHEN METABOLIC PANEL: CPT | Performed by: EMERGENCY MEDICINE

## 2018-01-01 PROCEDURE — 70450 CT HEAD/BRAIN W/O DYE: CPT

## 2018-01-01 PROCEDURE — 82550 ASSAY OF CK (CPK): CPT | Performed by: EMERGENCY MEDICINE

## 2018-01-01 PROCEDURE — 81002 URINALYSIS NONAUTO W/O SCOPE: CPT | Performed by: EMERGENCY MEDICINE

## 2018-01-01 PROCEDURE — 86618 LYME DISEASE ANTIBODY: CPT | Performed by: PHYSICIAN ASSISTANT

## 2018-01-01 PROCEDURE — 99285 EMERGENCY DEPT VISIT HI MDM: CPT

## 2018-01-01 PROCEDURE — 80053 COMPREHEN METABOLIC PANEL: CPT | Performed by: PHYSICIAN ASSISTANT

## 2018-01-01 PROCEDURE — 96372 THER/PROPH/DIAG INJ SC/IM: CPT

## 2018-01-01 PROCEDURE — 73502 X-RAY EXAM HIP UNI 2-3 VIEWS: CPT

## 2018-01-01 RX ORDER — IBUPROFEN 800 MG/1
800 TABLET ORAL EVERY 6 HOURS PRN
Qty: 30 TABLET | Refills: 0 | Status: SHIPPED | OUTPATIENT
Start: 2018-01-01 | End: 2018-01-01

## 2018-01-01 RX ORDER — NALOXONE HYDROCHLORIDE 1 MG/ML
INJECTION PARENTERAL CODE/TRAUMA/SEDATION MEDICATION
Status: COMPLETED | OUTPATIENT
Start: 2018-01-01 | End: 2018-01-01

## 2018-01-01 RX ORDER — EPINEPHRINE 0.1 MG/ML
SYRINGE (ML) INJECTION CODE/TRAUMA/SEDATION MEDICATION
Status: COMPLETED | OUTPATIENT
Start: 2018-01-01 | End: 2018-01-01

## 2018-01-01 RX ORDER — METOCLOPRAMIDE HYDROCHLORIDE 5 MG/ML
10 INJECTION INTRAMUSCULAR; INTRAVENOUS ONCE
Status: COMPLETED | OUTPATIENT
Start: 2018-01-01 | End: 2018-01-01

## 2018-01-01 RX ORDER — NAPROXEN 250 MG/1
250 TABLET ORAL ONCE
Status: COMPLETED | OUTPATIENT
Start: 2018-01-01 | End: 2018-01-01

## 2018-01-01 RX ORDER — MAGNESIUM SULFATE HEPTAHYDRATE 40 MG/ML
2 INJECTION, SOLUTION INTRAVENOUS ONCE
Status: COMPLETED | OUTPATIENT
Start: 2018-01-01 | End: 2018-01-01

## 2018-01-01 RX ORDER — DIPHENHYDRAMINE HYDROCHLORIDE 50 MG/ML
25 INJECTION INTRAMUSCULAR; INTRAVENOUS ONCE
Status: COMPLETED | OUTPATIENT
Start: 2018-01-01 | End: 2018-01-01

## 2018-01-01 RX ORDER — CYCLOBENZAPRINE HCL 10 MG
10 TABLET ORAL ONCE
Status: COMPLETED | OUTPATIENT
Start: 2018-01-01 | End: 2018-01-01

## 2018-01-01 RX ORDER — KETOROLAC TROMETHAMINE 30 MG/ML
30 INJECTION, SOLUTION INTRAMUSCULAR; INTRAVENOUS ONCE
Status: COMPLETED | OUTPATIENT
Start: 2018-01-01 | End: 2018-01-01

## 2018-01-01 RX ORDER — CYCLOBENZAPRINE HCL 10 MG
10 TABLET ORAL 2 TIMES DAILY PRN
Qty: 20 TABLET | Refills: 0 | Status: SHIPPED | OUTPATIENT
Start: 2018-01-01 | End: 2018-01-01

## 2018-01-01 RX ORDER — NAPROXEN 250 MG/1
250 TABLET ORAL 2 TIMES DAILY WITH MEALS
Qty: 20 TABLET | Refills: 0 | Status: SHIPPED | OUTPATIENT
Start: 2018-01-01 | End: 2018-01-01

## 2018-01-01 RX ORDER — BUTALBITAL, ACETAMINOPHEN AND CAFFEINE 50; 325; 40 MG/1; MG/1; MG/1
1 TABLET ORAL EVERY 6 HOURS PRN
Qty: 12 TABLET | Refills: 0 | Status: SHIPPED | OUTPATIENT
Start: 2018-01-01 | End: 2018-01-01

## 2018-01-01 RX ORDER — KETOROLAC TROMETHAMINE 30 MG/ML
15 INJECTION, SOLUTION INTRAMUSCULAR; INTRAVENOUS ONCE
Status: COMPLETED | OUTPATIENT
Start: 2018-01-01 | End: 2018-01-01

## 2018-01-01 RX ADMIN — NALOXONE HYDROCHLORIDE 2 MG: 1 INJECTION PARENTERAL at 21:25

## 2018-01-01 RX ADMIN — EPINEPHRINE 1 MG: 0.1 INJECTION, SOLUTION ENDOTRACHEAL; INTRACARDIAC; INTRAVENOUS at 21:24

## 2018-01-01 RX ADMIN — KETOROLAC TROMETHAMINE 30 MG: 30 INJECTION, SOLUTION INTRAMUSCULAR at 17:52

## 2018-01-01 RX ADMIN — EPINEPHRINE 1 MG: 0.1 INJECTION, SOLUTION ENDOTRACHEAL; INTRACARDIAC; INTRAVENOUS at 21:28

## 2018-01-01 RX ADMIN — KETOROLAC TROMETHAMINE 15 MG: 30 INJECTION, SOLUTION INTRAMUSCULAR at 23:31

## 2018-01-01 RX ADMIN — NAPROXEN 250 MG: 250 TABLET ORAL at 03:31

## 2018-01-01 RX ADMIN — EPINEPHRINE 1 MG: 0.1 INJECTION, SOLUTION ENDOTRACHEAL; INTRACARDIAC; INTRAVENOUS at 21:31

## 2018-01-01 RX ADMIN — DIPHENHYDRAMINE HYDROCHLORIDE 25 MG: 50 INJECTION, SOLUTION INTRAMUSCULAR; INTRAVENOUS at 17:47

## 2018-01-01 RX ADMIN — SODIUM CHLORIDE 1000 ML: 0.9 INJECTION, SOLUTION INTRAVENOUS at 17:46

## 2018-01-01 RX ADMIN — CYCLOBENZAPRINE HYDROCHLORIDE 10 MG: 10 TABLET, FILM COATED ORAL at 03:32

## 2018-01-01 RX ADMIN — MAGNESIUM SULFATE HEPTAHYDRATE 2 G: 40 INJECTION, SOLUTION INTRAVENOUS at 17:54

## 2018-01-01 RX ADMIN — METOCLOPRAMIDE 10 MG: 5 INJECTION, SOLUTION INTRAMUSCULAR; INTRAVENOUS at 17:50

## 2018-01-01 RX ADMIN — SODIUM CHLORIDE 1000 ML: 0.9 INJECTION, SOLUTION INTRAVENOUS at 10:45

## 2018-01-22 NOTE — ED NOTES
Patient transported to [facility] , [address] [facility]  01/22/18 2747
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
## 2018-01-25 NOTE — PROGRESS NOTES
Patient called back  Informed about x-ray results  Informed that he should follow up with Orthopedics  Patient verbalized understanding and agrees to do so
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
## 2018-05-19 NOTE — ED NOTES
Patient reports great improvement with headache  No photosensitivity  Lights turned on in room       Prince Ghotra RN  05/19/18 [id number]

## 2018-05-19 NOTE — DISCHARGE INSTRUCTIONS
Migraine Headache   WHAT YOU NEED TO KNOW:   A migraine is a severe headache  The pain can be so severe that it interferes with your daily activities  A migraine can last a few hours up to several days  The exact cause of migraines is not known  DISCHARGE INSTRUCTIONS:   Return to the emergency department if:   · You have a headache that seems different or much worse than your usual migraine headache  · You have a severe headache with a fever or a stiff neck  · You have new problems with speech, vision, balance, or movement  · You feel like you are going to faint, you become confused, or you have a seizure  Contact your healthcare provider or neurologist if:   · Your migraines interfere with your daily activities  · Your medicines or treatments stop working  · You have questions or concerns about your condition or care  Medicines: You may need any of the following  Take medicine as soon as you feel a migraine begin  · Prescription pain medicine  may be given  Do not wait until the pain is severe before you take your medicine  · Migraine medicines  are used to help prevent a migraine or stop it once it starts  · Antinausea medicine  may be given to calm your stomach and to help prevent vomiting  This medicine can also help relieve pain  · Take your medicine as directed  Contact your healthcare provider if you think your medicine is not helping or if you have side effects  Tell him or her if you are allergic to any medicine  Keep a list of the medicines, vitamins, and herbs you take  Include the amounts, and when and why you take them  Bring the list or the pill bottles to follow-up visits  Carry your medicine list with you in case of an emergency  Manage your symptoms:   · Rest in a dark, quiet room  This will help decrease your pain  Sleep may also help relieve the pain  · Apply ice to decrease pain  Use an ice pack, or put crushed ice in a plastic bag   Cover the ice pack with a towel and place it on your head  Apply ice for 15 to 20 minutes every hour  · Apply heat to decrease pain and muscle spasms  Use a small towel dampened with warm water or a heating pad, or sit in a warm bath  Apply heat on the area for 20 to 30 minutes every 2 hours  You may alternate heat and ice  · Keep a migraine record  Write down when your migraines start and stop  Include your symptoms and what you were doing when a migraine began  Record what you ate or drank for 24 hours before the migraine started  Keep track of what you did to treat your migraine and if it worked  Bring the migraine record with you to visits with your healthcare provider  Follow up with your healthcare provider or neurologist as directed:  Bring your migraine record with you  Write down your questions so you remember to ask them during your visits  Prevent another migraine:   · Do not smoke  Nicotine and other chemicals in cigarettes and cigars can trigger a migraine or make it worse  Ask your healthcare provider for information if you currently smoke and need help to quit  E-cigarettes or smokeless tobacco still contain nicotine  Talk to your healthcare provider before you use these products  · Do not drink alcohol  Alcohol can trigger a migraine  It can also keep medicines used to treat your migraines from working  · Get regular exercise  Exercise may help prevent migraines  Talk to your healthcare provider about the best exercise plan for you  Try to get at least 30 minutes of exercise on most days  · Manage stress  Stress may trigger a migraine  Learn new ways to relax, such as deep breathing  · Create a sleep schedule  Go to bed and get up at the same times each day  Do not watch television before bed  · Eat regular meals  Include healthy foods such as include fruit, vegetables, whole-grain breads, low-fat dairy products, beans, lean meat, and fish   Do not have food or drinks that trigger your migraines  © 2017 [facility] Information is for End User's use only and may not be sold, redistributed or otherwise used for commercial purposes  All illustrations and images included in CareNotes® are the copyrighted property of A D A M [employer] Inc  or Eben Beaver  The above information is an  only  It is not intended as medical advice for individual conditions or treatments  Talk to your doctor, nurse or pharmacist before following any medical regimen to see if it is safe and effective for you

## 2018-06-21 NOTE — DISCHARGE INSTRUCTIONS
Dehydration   WHAT YOU NEED TO KNOW:   Dehydration is a condition that develops when your body does not have enough fluid  You may become dehydrated if you do not drink enough water or lose too much fluid  Fluid loss may also cause loss of electrolytes (minerals), such as sodium  DISCHARGE INSTRUCTIONS:   Return to the emergency department if:   · You have a seizure  · You are confused or cannot think clearly  · You are extremely sleepy, or another person cannot wake you  · You become dizzy or faint when you stand  · You are not able to urinate  · You have trouble breathing  · You have a fast or irregular heartbeat  · Your hands or feet are cold, or your face is pale  Contact your healthcare provider if:   · You have trouble drinking liquids because you are vomiting  · Your symptoms get worse  · You have a fever  · You feel very weak or tired  · You have questions or concerns about your condition or care  Follow up with your healthcare provider as directed:  Write down your questions so you remember to ask them during your visits  Prevent or manage dehydration:   · Drink liquids as directed  Liquids that contain water, sugar, and minerals can help your body hold in fluid and help prevent dehydration  Drink liquids throughout the day, not just when you feel thirsty  Men should drink about 3 liters (13 eight-ounce cups) of liquid each day  Women should drink about 2 liters (9 eight-ounce cups) of liquid each day  Drink even more liquid if you will be outdoors, in the sun for a long time, or exercising  · Stay cool  Limit the time you spend outdoors during the hottest part of the day  Dress in lightweight clothes  · Keep track of how often you urinate  If you urinate less than usual or your urine is darker, drink more liquids    © 2017 [employer] Information is for End User's use only and may not be sold, redistributed or otherwise used for commercial purposes  All illustrations and images included in CareNotes® are the copyrighted property of A [employer] M [employer]  or Eben Beaver  The above information is an  only  It is not intended as medical advice for individual conditions or treatments  Talk to your doctor, nurse or pharmacist before following any medical regimen to see if it is safe and effective for you

## 2018-06-21 NOTE — ED PROVIDER NOTES
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 and the past 2 days he "feels dehydrated and has full body muscle cramps at times"       History provided by:  Patient  Muscle Pain   Location:  Generalized  Quality:  Mainly back and upper thighs  Severity:  Moderate  Onset quality:  Gradual  Duration:  2 days  Timing:  Constant  Progression:  Worsening  Chronicity:  New  Context:  Patient works as a , large amount of physical activity over the past couple days been intensely hot has been rehydrated with just water usually uses Gatorade or some other solution that contained electrolytes  , today concerned about dehydration so prompted ED visit  Relieved by:  Nothing, none tried  Worsened by:  Ambulation worsens muscle aches  Ineffective treatments:  None tried  Associated symptoms: myalgias    Associated symptoms: no abdominal pain, no chest pain, no congestion, no cough, no diarrhea, no fatigue, no fever, no headaches, no nausea, no rash, no shortness of breath, no sore throat, no vomiting and no wheezing        Prior to Admission Medications   Prescriptions Last Dose Informant Patient Reported? Taking?   ibuprofen (MOTRIN) 800 mg tablet   No No   Sig: Take 1 tablet (800 mg total) by mouth every 6 (six) hours as needed for mild pain for up to 10 days      Facility-Administered Medications: None       Past Medical History:   Diagnosis Date    Concussion        Past Surgical History:   Procedure Laterality Date    APPENDECTOMY         History reviewed  No pertinent family history  I have reviewed and agree with the history as documented  Social History   Substance Use Topics    Smoking status: Current Every Day Smoker     Packs/day: 0 50    Smokeless tobacco: Never Used    Alcohol use No        Review of Systems   Constitutional: Negative for activity change, chills, diaphoresis, fatigue and fever     HENT: Negative for congestion, sinus pressure and sore throat  Eyes: Negative for pain and visual disturbance  Respiratory: Negative for cough, chest tightness, shortness of breath, wheezing and stridor  Cardiovascular: Negative for chest pain and palpitations  Gastrointestinal: Negative for abdominal distention, abdominal pain, constipation, diarrhea, nausea and vomiting  Genitourinary: Negative for dysuria and frequency  Musculoskeletal: Positive for myalgias  Negative for neck pain and neck stiffness  Skin: Negative for rash  Neurological: Negative for dizziness, speech difficulty, light-headedness, numbness and headaches  Physical Exam  Physical Exam   Constitutional: He is oriented to person, place, and time  He appears well-developed  No distress  HENT:   Head: Normocephalic and atraumatic  Dry mucous membranes   Eyes: Pupils are equal, round, and reactive to light  Neck: Normal range of motion  Neck supple  No tracheal deviation present  Cardiovascular: Normal rate, regular rhythm, normal heart sounds and intact distal pulses  No murmur heard  Pulmonary/Chest: Effort normal and breath sounds normal  No stridor  No respiratory distress  Abdominal: Soft  He exhibits no distension  There is no tenderness  There is no rebound and no guarding  Musculoskeletal: Normal range of motion  Neurological: He is alert and oriented to person, place, and time  Skin: Skin is warm and dry  He is not diaphoretic  No erythema  No pallor  Psychiatric: He has a normal mood and affect  Vitals reviewed        Vital Signs  ED Triage Vitals [06/21/18 1020]   Temperature Pulse Respirations Blood Pressure SpO2   99 8 °F (37 7 °C) 100 18 142/63 97 %      Temp Source Heart Rate Source Patient Position - Orthostatic VS BP Location FiO2 (%)   Oral Monitor Sitting Right arm --      Pain Score       --           Vitals:    06/21/18 1020   BP: 142/63   Pulse: 100   Patient Position - Orthostatic VS: Sitting       Visual Acuity      ED Medications  Medications   sodium chloride 0 9 % bolus 1,000 mL (1,000 mL Intravenous New Bag 6/21/18 1045)       Diagnostic Studies  Results Reviewed     Procedure Component Value Units Date/Time    Basic metabolic panel [[id number]]  (Abnormal) Collected:  06/21/18 1045    Lab Status:  Final result Specimen:  Blood from Arm, Right Updated:  06/21/18 1105     Sodium 134 (L) mmol/L      Potassium 3 5 mmol/L      Chloride 99 (L) mmol/L      CO2 27 mmol/L      Anion Gap 8 mmol/L      BUN 13 mg/dL      Creatinine 1 16 mg/dL      Glucose 127 mg/dL      Calcium 8 6 mg/dL      eGFR 85 ml/min/1 73sq m     Narrative:         National Kidney Disease Education Program recommendations are as follows:  GFR calculation is accurate only with a steady state creatinine  Chronic Kidney disease less than 60 ml/min/1 73 sq  meters  Kidney failure less than 15 ml/min/1 73 sq  meters      CK (with reflex to MB) [[id number]]  (Normal) Collected:  06/21/18 1045    Lab Status:  Final result Specimen:  Blood from Arm, Right Updated:  06/21/18 1105     Total CK 68 U/L     CBC and differential [[id number]]  (Abnormal) Collected:  06/21/18 1045    Lab Status:  Final result Specimen:  Blood from Arm, Right Updated:  06/21/18 1052     WBC 8 87 Thousand/uL      RBC 4 46 Million/uL      Hemoglobin 13 2 g/dL      Hematocrit 37 7 %      MCV 85 fL      MCH 29 6 pg      MCHC 35 0 g/dL      RDW 14 0 %      MPV 10 6 fL      Platelets 067 (L) Thousands/uL      Neutrophils Relative 88 (H) %      Lymphocytes Relative 7 (L) %      Monocytes Relative 5 %      Eosinophils Relative 0 %      Basophils Relative 0 %      Neutrophils Absolute 7 76 (H) Thousands/µL      Lymphocytes Absolute 0 62 Thousands/µL      Monocytes Absolute 0 45 Thousand/µL      Eosinophils Absolute 0 03 Thousand/µL      Basophils Absolute 0 01 Thousands/µL                  No orders to display              Procedures  Procedures       Phone Contacts  ED Phone Contact    ED Course  ED Course as of Jun 21 1118   Thu Jun 21, 2018   1116 Blood workShowing mild hyponatremia  No evidence of rhabdomyolysis  , patient given L of IV fluids here  Note for work  Will discharge                                MDM  Number of Diagnoses or Management Options  Acute hyponatremia: new and requires workup  Dehydration: new and requires workup  Diagnosis management comments:       Initial ED assessment:  10-year-old male presents with generalized muscle aches, feeling weak tired, large amount of sweating and hydrating with simple water over the past few days    Initial DDx includes but is not limited to:  Dehydration, electrolyte abnormality, rhabdomyolysis, less likely rhabdomyolysis leading to renal failure    Initial ED plan:  IV fluids, check electrolytes, check CPK, disposition pending ED workup        Final ED summary/disposition:   After evaluation and workup in the emergency department, blood work showing mild hyponatremia  No evidence rhabdomyolysis no evidence of renal failure  Will discharge        Amount and/or Complexity of Data Reviewed  Clinical lab tests: ordered and reviewed  Review and summarize past medical records: yes      CritCare Time    Disposition  Final diagnoses:   Dehydration   Acute hyponatremia     Time reflects when diagnosis was documented in both MDM as applicable and the Disposition within this note     Time User Action Codes Description Comment    6/21/2018 11:16 AM Tomeka GONZALEZ Add [E86 0] Dehydration     6/21/2018 11:16 AM Haydee Kahn Add [E87 1] Acute hyponatremia       ED Disposition     ED Disposition Condition Comment    Discharge  Emmett Warren  discharge to home/self care  Condition at discharge: Good        Follow-up Information    None         Patient's Medications   Discharge Prescriptions    No medications on file     No discharge procedures on file      ED Provider  Electronically Signed by           Lizette Dunne DO  06/21/18 1118

## 2018-06-23 NOTE — DISCHARGE INSTRUCTIONS
Fatigue   WHAT YOU NEED TO KNOW:   Fatigue is mental and physical exhaustion that does not get better with rest  Fatigue may make daily activities difficult or cause extreme sleepiness  It is normal to feel tired sometimes, but long-term fatigue may be a sign of serious illness  DISCHARGE INSTRUCTIONS:   Seek care immediately if:   · You have chest pain  · You have difficulty breathing  Contact your healthcare provider if:   · You have a cough that gets worse, or does not go away  · You see blood in your urine or bowel movement  · You have numbness or tingling around your mouth or in an arm or leg  · You faint, feel dizzy, or have vision changes  · You have swelling in your lymph nodes  · You are a woman and have vaginal bleeding that is not normal for you, or is not expected  · You lose weight without trying, or you have trouble eating  · You feel weak or have muscle pain  · You have pain or swelling in your joints  · You have questions or concerns about your condition or care  Follow up with your healthcare provider as directed: You may need more tests  Your healthcare provider may also refer you to a specialist  Write down your questions so you remember to ask them during your visits  Manage fatigue:   · Keep a fatigue diary  Include anything that makes you feel more tired or less tired  Bring the diary with you to follow-up visits with your provider  · Exercise as directed  Exercise can help you feel more alert  Exercise can also help you manage stress or relieve depression  Try to get at least 30 minutes of exercise most days of the week  · Keep a regular sleep schedule  Go to bed and wake up at the same times every day  Limit naps to 1 hour each day  A nap can improve fatigue, but a long nap may make it harder to go to sleep at night  · Plan and limit your activities  Limit the number of activities such as shopping and cleaning you do each day   If possible, try to spread out your trips throughout the week  Plan ahead so you are not rushing to get something done  Only do activities that you have the energy to complete  Take breaks between activities  Ask for help if you need it  Another person may be able to drive you or help with daily activities  · Eat a variety of healthy foods  Healthy foods include fruits, vegetables, whole-grain breads, low-fat dairy products, beans, lean meats, and fish  Good nutrition can help manage fatigue  · Limit caffeine and alcohol  These can make it difficult to fall or stay asleep  Women should limit alcohol to 1 drink a day  Men should limit alcohol to 2 drinks a day  A drink of alcohol is 12 ounces of beer, 5 ounces of wine, or 1½ ounces of liquor  Ask our healthcare provider how much caffeine is safe for you  · Do not smoke  Nicotine and other chemicals in cigarettes and cigars can cause lung damage and increase fatigue  Ask your healthcare provider for information if you currently smoke and need help to quit  E-cigarettes or smokeless tobacco still contain nicotine  Talk to your healthcare provider before you use these products  © 2017 [facility] Information is for End User's use only and may not be sold, redistributed or otherwise used for commercial purposes  All illustrations and images included in CareNotes® are the copyrighted property of [employer]  or [employer]ben Beaver  The above information is an  only  It is not intended as medical advice for individual conditions or treatments  Talk to your doctor, nurse or pharmacist before following any medical regimen to see if it is safe and effective for you

## 2018-06-23 NOTE — ED PROVIDER NOTES
History  Chief Complaint   Patient presents with    Lethargy     as per EMS, patient's fiance called [phone], because he seemed to be confused and sleepy  She states he is a known heroin user  Patient denies using heroin or drinking alcohol today  Pupils pinpoint  Patient is alert, oriented, appropriate  Patient is a 29-year-old male who presents via squad, the patient denies any symptoms at this time  Patient denies using any illegal substances  911 was called by the patient's fiancee who thought he looked lethargic and maybe using heroin again  Patient denies any drug use, he is awake alert and offers no complaints and wants to leave AMA  None       Past Medical History:   Diagnosis Date    Concussion     Heroin abuse     PTSD (post-traumatic stress disorder)        Past Surgical History:   Procedure Laterality Date    APPENDECTOMY         History reviewed  No pertinent family history  I have reviewed and agree with the history as documented      Social History   Substance Use Topics    Smoking status: Current Every Day Smoker     Packs/day: 1 00    Smokeless tobacco: Never Used    Alcohol use No        Review of Systems    Physical Exam  Physical Exam    Vital Signs  ED Triage Vitals [06/23/18 1756]   Temperature Pulse Respirations Blood Pressure SpO2   98 6 °F (37 °C) 101 17 157/70 98 %      Temp Source Heart Rate Source Patient Position - Orthostatic VS BP Location FiO2 (%)   Tympanic Monitor Sitting Left arm --      Pain Score       No Pain           Vitals:    06/23/18 1756   BP: 157/70   Pulse: 101   Patient Position - Orthostatic VS: Sitting       Visual Acuity      ED Medications  Medications - No data to display    Diagnostic Studies  Results Reviewed     None                 No orders to display              Procedures  Procedures       Phone Contacts  ED Phone Contact    ED Course                               MDM  Number of Diagnoses or Management Options  Lethargy:   Diagnosis management comments: Patient is awake alert and oriented, he is showing no acute signs of intoxication  Patient wants to leave AMA and he was instructed about the risks of leaving AMA  Patient states he will signed the paperwork that he is going to leave  There is nothing at this time that I could keep him here    CritCare Time    Disposition  Final diagnoses:   Lethargy     Time reflects when diagnosis was documented in both MDM as applicable and the Disposition within this note     Time User Action Codes Description Comment    6/23/2018  6:00 PM Noé Husbands Add [E04 78] [address]       ED Disposition     ED Disposition Condition Comment    AMA  Date: 6/23/2018  Patient: Abraham Sesay  Admitted: 6/23/2018  5:54 PM  Attending Provider: Jose Keenan MD    Abraham Sesay  or his authorized caregiver has made the decision for the patient to leave the emergency department against the adv ice of the emergency department staff  He or his authorized caregiver has been informed and understands the inherent risks, including death  He or his authorized caregiver has decided to accept the responsibility for this decision  Abraham Sesay   and all necessary parties have been advised that he may return for further evaluation or treatment  His condition at time of discharge was stable  Abraham Sesay  had current vital signs as follows:  /70 (BP Location: Left arm)   Pulse 101    Temp 98 6 °F (37 °C) (Tympanic)   Resp 17         Follow-up Information     Follow up With Specialties Details Why Contact Info Additional Information    [address] [facility] Emergency Department Emergency Medicine  If symptoms worsen [address] [facility]  [phone] [facility], [place], [place], [address]          There are no discharge medications for this patient  No discharge procedures on file      ED Provider  Electronically Signed by           Rafael Huber Franko Au MD  06/23/18 1338

## 2018-08-11 NOTE — ED PROVIDER NOTES
History  Chief Complaint   Patient presents with    Cardiac Arrest     pt overdosed, girlfriend found him unresponsive at 2050 and called 911 at 2054  Pt arrived CPR in progress  CPR has been going on for 30 minutes according to medics     Pt in [facility], arrived with medics with CPR in progress  Pt allegedly found unresponsive by his fiancee at 2050, when she arrived back home  Pt was last seen approx 1hr prior  Per medics, pt received narcan and epi en route, with no ROSC  Pt re-intubated on arrival to the ER, given 2 rounds of epi, and 2 doses of narcan with no ROSC  Pt pronounced at 933p  Pt's fiancee at bedside            None       History reviewed  No pertinent past medical history  History reviewed  No pertinent surgical history  History reviewed  No pertinent family history  I have reviewed and agree with the history as documented  Social History   Substance Use Topics    Smoking status: Not on file    Smokeless tobacco: Not on file    Alcohol use Not on file        Review of Systems   Unable to perform ROS: Unstable vital signs       Physical Exam  Physical Exam   Constitutional: He appears well-developed and well-nourished  HENT:   Head: Normocephalic and atraumatic  Eyes:   Pupils fixed and dilated   Cardiovascular:   Pulseless   Pulmonary/Chest:   Intubated, mechanical ventilation   Nursing note and vitals reviewed        Vital Signs  ED Triage Vitals [08/10/18 2120]   Temperature Pulse Respirations Blood Pressure SpO2   (!) 93 1 °F (33 9 °C) (!) 0 (!) 0 (!) 0/0 (!) 0 %      Temp Source Heart Rate Source Patient Position - Orthostatic VS BP Location FiO2 (%)   Tympanic Monitor Lying Left arm --      Pain Score       --           Vitals:    08/10/18 2120 08/10/18 2157 08/10/18 2213   BP: (!) 0/0 (!) 0/0 (!) 0/0   Pulse: (!) 0 (!) 0 (!) 0   Patient Position - Orthostatic VS: Lying         Visual Acuity      ED Medications  Medications   EPINEPHrine (ADRENALIN) 1 mg/10 mL injection (1 mg Intravenous Given 8/10/18 2131)   naloxone (NARCAN) 2 mg/2 mL injection (2 mg Intravenous Given 8/10/18 2125)       Diagnostic Studies  Results Reviewed     None                 No orders to display              Procedures  Intubation  Date/Time: 8/10/2018 9:20 PM  Performed by: Collin Sierra by: Haleigh Flores     Patient location:  ED  Other Assisting Provider: No    Consent:     Consent obtained:  Emergent situation    Alternatives discussed:  No treatment  Universal protocol:     Procedure explained and questions answered to patient or proxy's satisfaction: no      Relevant documents present and verified: no      Test results available and properly labeled: no      Radiology Images displayed and confirmed  If images not available, report reviewed: no      Required blood products, implants, devices, and special equipment available: no      Site/side marked: no      Immediately prior to procedure, a time out was called: no      Patient identity confirmed:  Arm band  Pre-procedure details:     Patient status:  Unresponsive    Pretreatment medications:  None    Paralytics:  None  Indications:     Indications for intubation: respiratory failure    Procedure details:     Preoxygenation:  Bag valve mask    CPR in progress: yes      Intubation method:  Oral    Oral intubation technique:  Direct    Laryngoscope blade: Mac 4    Tube size (mm):  7 5    Tube type:  Cuffed    Number of attempts:  1    Ventilation between attempts: yes      Cricoid pressure: yes      Tube visualized through cords: yes    Placement assessment:     ETT to lip:  25    ETT to teeth:  24    Tube secured with:  ETT farris    Breath sounds:  Equal    Placement verification: chest rise and ETCO2 detector    Post-procedure details:     Patient tolerance of procedure:   Tolerated well, no immediate complications           Phone Contacts  ED Phone Contact    ED Course                               MDM  Number of Diagnoses or Management Options  Cardiac arrest [facility]):   Diagnosis management comments: Pt pronounced at 933p  Please see code recorder for more details  Risk of Complications, Morbidity, and/or Mortality  Presenting problems: high  General comments:     Patient Progress  Patient progress: other (comment)    CritCare Time    Disposition  Final diagnoses:   Cardiac arrest [facility])     Time reflects when diagnosis was documented in both MDM as applicable and the Disposition within this note     Time User Action Codes Description Comment    8/10/2018  9:53 PM Haim Cross Add [I46 9] Cardiac arrest [facility])       ED Disposition     ED Disposition Condition Comment            Follow-up Information    None       Date, Time and Cause of Death    Preliminary Cause of Death:  Cardiac arrest ([facility] [address] )       There are no discharge medications for this patient  No discharge procedures on file      ED Provider  Electronically Signed by           Marleen Rodriguez DO  [phone]

## 2018-08-11 NOTE — RESPIRATORY THERAPY NOTE
Called to ED, patient coming in CPR in progress  Manually ventilated patient with Ambu bag and O2  Patient was intubated by MD  I continued manual ventilation until patient was pronounced
